# Patient Record
Sex: FEMALE | Race: WHITE | Employment: FULL TIME | URBAN - METROPOLITAN AREA
[De-identification: names, ages, dates, MRNs, and addresses within clinical notes are randomized per-mention and may not be internally consistent; named-entity substitution may affect disease eponyms.]

---

## 2018-11-27 ENCOUNTER — HOSPITAL ENCOUNTER (EMERGENCY)
Age: 23
Discharge: HOME | End: 2018-11-27
Attending: STUDENT IN AN ORGANIZED HEALTH CARE EDUCATION/TRAINING PROGRAM

## 2018-11-27 ENCOUNTER — APPOINTMENT (OUTPATIENT)
Dept: GENERAL RADIOLOGY | Age: 23
End: 2018-11-27

## 2018-11-27 VITALS
WEIGHT: 142 LBS | RESPIRATION RATE: 20 BRPM | OXYGEN SATURATION: 96 % | TEMPERATURE: 98.5 F | BODY MASS INDEX: 26.81 KG/M2 | SYSTOLIC BLOOD PRESSURE: 123 MMHG | DIASTOLIC BLOOD PRESSURE: 82 MMHG | HEART RATE: 94 BPM | HEIGHT: 61 IN

## 2018-11-27 DIAGNOSIS — N91.2 AMENORRHEA: ICD-10-CM

## 2018-11-27 DIAGNOSIS — J20.9 ACUTE BRONCHITIS, UNSPECIFIED ORGANISM: Primary | ICD-10-CM

## 2018-11-27 LAB
BACTERIA: ABNORMAL /HPF
BILIRUBIN URINE: NEGATIVE
BLOOD, URINE: NEGATIVE
CHP ED QC CHECK: NORMAL
CLARITY: CLEAR
COLOR: YELLOW
EKG ATRIAL RATE: 80 BPM
EKG P AXIS: 43 DEGREES
EKG P-R INTERVAL: 160 MS
EKG Q-T INTERVAL: 402 MS
EKG QRS DURATION: 84 MS
EKG QTC CALCULATION (BAZETT): 463 MS
EKG R AXIS: 76 DEGREES
EKG T AXIS: 21 DEGREES
EKG VENTRICULAR RATE: 80 BPM
EPITHELIAL CELLS, UA: ABNORMAL /HPF (ref 0–5)
GLUCOSE URINE: NEGATIVE MG/DL
HYALINE CASTS: ABNORMAL /HPF (ref 0–5)
KETONES, URINE: NEGATIVE MG/DL
LEUKOCYTE ESTERASE, URINE: ABNORMAL
NITRITE, URINE: NEGATIVE
PH UA: 6 (ref 5–9)
PREGNANCY TEST URINE, POC: NEGATIVE
PROTEIN UA: NEGATIVE MG/DL
RBC UA: ABNORMAL /HPF (ref 0–2)
S PYO AG THROAT QL: NEGATIVE
SPECIFIC GRAVITY UA: 1.02 (ref 1–1.03)
URINE REFLEX TO CULTURE: YES
UROBILINOGEN, URINE: 0.2 E.U./DL
WBC UA: ABNORMAL /HPF (ref 0–5)

## 2018-11-27 PROCEDURE — 71046 X-RAY EXAM CHEST 2 VIEWS: CPT

## 2018-11-27 PROCEDURE — 6370000000 HC RX 637 (ALT 250 FOR IP): Performed by: STUDENT IN AN ORGANIZED HEALTH CARE EDUCATION/TRAINING PROGRAM

## 2018-11-27 PROCEDURE — 94640 AIRWAY INHALATION TREATMENT: CPT

## 2018-11-27 PROCEDURE — 87081 CULTURE SCREEN ONLY: CPT

## 2018-11-27 PROCEDURE — 99285 EMERGENCY DEPT VISIT HI MDM: CPT

## 2018-11-27 PROCEDURE — 87880 STREP A ASSAY W/OPTIC: CPT

## 2018-11-27 PROCEDURE — 87086 URINE CULTURE/COLONY COUNT: CPT

## 2018-11-27 PROCEDURE — 93005 ELECTROCARDIOGRAM TRACING: CPT

## 2018-11-27 PROCEDURE — 81001 URINALYSIS AUTO W/SCOPE: CPT

## 2018-11-27 RX ORDER — AZITHROMYCIN 500 MG/1
500 TABLET, FILM COATED ORAL ONCE
Status: COMPLETED | OUTPATIENT
Start: 2018-11-27 | End: 2018-11-27

## 2018-11-27 RX ORDER — IPRATROPIUM BROMIDE AND ALBUTEROL SULFATE 2.5; .5 MG/3ML; MG/3ML
1 SOLUTION RESPIRATORY (INHALATION) ONCE
Status: COMPLETED | OUTPATIENT
Start: 2018-11-27 | End: 2018-11-27

## 2018-11-27 RX ORDER — BENZONATATE 200 MG/1
200 CAPSULE ORAL 3 TIMES DAILY PRN
Qty: 21 CAPSULE | Refills: 0 | Status: SHIPPED | OUTPATIENT
Start: 2018-11-27 | End: 2021-12-26

## 2018-11-27 RX ORDER — ALBUTEROL SULFATE 90 UG/1
AEROSOL, METERED RESPIRATORY (INHALATION)
Qty: 1 INHALER | Refills: 1 | Status: SHIPPED | OUTPATIENT
Start: 2018-11-27

## 2018-11-27 RX ORDER — AZITHROMYCIN 250 MG/1
TABLET, FILM COATED ORAL
Qty: 1 PACKET | Refills: 0 | Status: SHIPPED | OUTPATIENT
Start: 2018-11-27 | End: 2018-12-01

## 2018-11-27 RX ADMIN — IPRATROPIUM BROMIDE AND ALBUTEROL SULFATE 1 AMPULE: .5; 3 SOLUTION RESPIRATORY (INHALATION) at 14:13

## 2018-11-27 RX ADMIN — AZITHROMYCIN MONOHYDRATE 500 MG: 500 TABLET ORAL at 15:37

## 2018-11-27 ASSESSMENT — PULMONARY FUNCTION TESTS
PEFR_L/MIN: 200
PEFR_L/MIN: 150

## 2018-11-27 ASSESSMENT — ENCOUNTER SYMPTOMS
COUGH: 1
SHORTNESS OF BREATH: 0
ABDOMINAL PAIN: 0
DIARRHEA: 0
SORE THROAT: 1
VOMITING: 1
CHEST TIGHTNESS: 0
BACK PAIN: 0
SINUS PRESSURE: 0
TROUBLE SWALLOWING: 0

## 2018-11-27 ASSESSMENT — PAIN SCALES - GENERAL: PAINLEVEL_OUTOF10: 4

## 2018-11-27 ASSESSMENT — PAIN DESCRIPTION - PAIN TYPE: TYPE: ACUTE PAIN

## 2018-11-27 ASSESSMENT — PAIN DESCRIPTION - LOCATION: LOCATION: CHEST

## 2018-11-27 NOTE — ED TRIAGE NOTES
Pt arrived to ER with c/o cough and congestion x4 days. Pt states today she began having dizziness and chest hurts to deep breathe. Pt is A&Ox4, skin p/w/d. resp even and unlabored.

## 2018-11-27 NOTE — ED PROVIDER NOTES
findings:        Interpretation per the Radiologist below, if available at the time ofthis note:    XR CHEST STANDARD (2 VW)   Final Result   NO ACUTE ACTIVE CARDIOPULMONARY PROCESS            ED BEDSIDE ULTRASOUND:   Performed by ED Physician - none    LABS:  Labs Reviewed   URINE RT REFLEX TO CULTURE - Abnormal; Notable for the following:        Result Value    Leukocyte Esterase, Urine TRACE (*)     All other components within normal limits   MICROSCOPIC URINALYSIS - Abnormal; Notable for the following:     WBC, UA 6-10 (*)     All other components within normal limits   POC PREGNANCY UR-QUAL - Normal   RAPID STREP SCREEN   URINE CULTURE   CULTURE BETA STREP CONFIRM PLATE       All other labs were within normal range or not returned as of this dictation. EMERGENCY DEPARTMENT COURSE and DIFFERENTIAL DIAGNOSIS/MDM:   Vitals:    Vitals:    11/27/18 1333 11/27/18 1406 11/27/18 1414   BP: 123/82     Pulse: 94     Resp:  16 20   Temp: 98.5 °F (36.9 °C)     SpO2: 96%     Weight: 142 lb (64.4 kg)     Height: 5' 1\" (1.549 m)             MDM  She states that she is frequently coughing and sometimes coughs so hard that she vomits. Regular cover the patient with macrolides to cover atypical infections. I discussed the findings with the patient she verbalized understanding as no further questions. Chest x-ray is negative. CONSULTS:  None    PROCEDURES:  Unless otherwise noted below, none     Procedures    FINAL IMPRESSION      1. Acute bronchitis, unspecified organism    2.  Amenorrhea          DISPOSITION/PLAN   DISPOSITION Discharge - Pending Orders Complete 11/27/2018 03:29:49 PM      PATIENT REFERRED TO:  [facility]  [address] [place]  [phone]  Call in 1 day        DISCHARGE MEDICATIONS:  New Prescriptions    ALBUTEROL SULFATE HFA (PROAIR HFA) 108 (90 BASE) MCG/ACT INHALER    Use every 4 hours while awake for 7-10 days then PRN wheezing  Dispense with SPACER and Instruct

## 2018-11-28 LAB — URINE CULTURE, ROUTINE: NORMAL

## 2018-11-29 LAB — S PYO THROAT QL CULT: NORMAL

## 2018-11-29 PROCEDURE — 93010 ELECTROCARDIOGRAM REPORT: CPT | Performed by: INTERNAL MEDICINE

## 2019-01-18 ENCOUNTER — APPOINTMENT (OUTPATIENT)
Dept: GENERAL RADIOLOGY | Age: 24
End: 2019-01-18
Payer: COMMERCIAL

## 2019-01-18 ENCOUNTER — HOSPITAL ENCOUNTER (EMERGENCY)
Age: 24
Discharge: HOME | End: 2019-01-18
Payer: COMMERCIAL

## 2019-01-18 VITALS
OXYGEN SATURATION: 100 % | WEIGHT: 142 LBS | TEMPERATURE: 98 F | BODY MASS INDEX: 26.81 KG/M2 | HEART RATE: 65 BPM | HEIGHT: 61 IN | DIASTOLIC BLOOD PRESSURE: 78 MMHG | SYSTOLIC BLOOD PRESSURE: 137 MMHG | RESPIRATION RATE: 16 BRPM

## 2019-01-18 DIAGNOSIS — M79.672 LEFT FOOT PAIN: Primary | ICD-10-CM

## 2019-01-18 PROCEDURE — 73630 X-RAY EXAM OF FOOT: CPT

## 2019-01-18 PROCEDURE — 99283 EMERGENCY DEPT VISIT LOW MDM: CPT

## 2019-01-18 RX ORDER — IBUPROFEN 800 MG/1
800 TABLET ORAL EVERY 8 HOURS PRN
Qty: 20 TABLET | Refills: 0 | Status: SHIPPED | OUTPATIENT
Start: 2019-01-18

## 2019-01-18 ASSESSMENT — PAIN DESCRIPTION - ORIENTATION: ORIENTATION: LEFT

## 2019-01-18 ASSESSMENT — ENCOUNTER SYMPTOMS
SORE THROAT: 0
RHINORRHEA: 0
EYE PAIN: 0
NAUSEA: 0
SHORTNESS OF BREATH: 0
PHOTOPHOBIA: 0
BACK PAIN: 0
ABDOMINAL PAIN: 0
VOMITING: 0
DIARRHEA: 0
COUGH: 0

## 2019-01-18 ASSESSMENT — PAIN DESCRIPTION - LOCATION: LOCATION: FOOT

## 2019-01-18 ASSESSMENT — PAIN DESCRIPTION - PAIN TYPE: TYPE: ACUTE PAIN

## 2019-01-18 ASSESSMENT — PAIN SCALES - GENERAL: PAINLEVEL_OUTOF10: 7

## 2021-12-26 ENCOUNTER — HOSPITAL ENCOUNTER (EMERGENCY)
Age: 26
Discharge: HOME | End: 2021-12-26
Attending: EMERGENCY MEDICINE

## 2021-12-26 VITALS
RESPIRATION RATE: 18 BRPM | HEIGHT: 60 IN | WEIGHT: 172 LBS | TEMPERATURE: 99 F | SYSTOLIC BLOOD PRESSURE: 120 MMHG | DIASTOLIC BLOOD PRESSURE: 78 MMHG | BODY MASS INDEX: 33.77 KG/M2 | HEART RATE: 63 BPM | OXYGEN SATURATION: 98 %

## 2021-12-26 DIAGNOSIS — O20.0 THREATENED ABORTION, ANTEPARTUM: ICD-10-CM

## 2021-12-26 DIAGNOSIS — R07.89 ATYPICAL CHEST PAIN: Primary | ICD-10-CM

## 2021-12-26 LAB
ALBUMIN SERPL-MCNC: 4 G/DL (ref 3.5–4.6)
ALP BLD-CCNC: 63 U/L (ref 40–130)
ALT SERPL-CCNC: 42 U/L (ref 0–33)
ANION GAP SERPL CALCULATED.3IONS-SCNC: 14 MEQ/L (ref 9–15)
AST SERPL-CCNC: 32 U/L (ref 0–35)
BACTERIA: ABNORMAL /HPF
BASOPHILS ABSOLUTE: 0.1 K/UL (ref 0–0.1)
BASOPHILS RELATIVE PERCENT: 0.4 % (ref 0.1–1.2)
BILIRUB SERPL-MCNC: 0.3 MG/DL (ref 0.2–0.7)
BILIRUBIN URINE: NEGATIVE
BLOOD, URINE: NORMAL
BUN BLDV-MCNC: 10 MG/DL (ref 6–20)
CALCIUM SERPL-MCNC: 9.3 MG/DL (ref 8.5–9.9)
CHLORIDE BLD-SCNC: 103 MEQ/L (ref 95–107)
CLARITY: CLEAR
CO2: 20 MEQ/L (ref 20–31)
COLOR: YELLOW
CREAT SERPL-MCNC: 0.61 MG/DL (ref 0.5–0.9)
EKG ATRIAL RATE: 75 BPM
EKG P AXIS: 29 DEGREES
EKG P-R INTERVAL: 164 MS
EKG Q-T INTERVAL: 432 MS
EKG QRS DURATION: 76 MS
EKG QTC CALCULATION (BAZETT): 482 MS
EKG R AXIS: 88 DEGREES
EKG T AXIS: 30 DEGREES
EKG VENTRICULAR RATE: 75 BPM
EOSINOPHILS ABSOLUTE: 0.2 K/UL (ref 0–0.4)
EOSINOPHILS RELATIVE PERCENT: 1.4 % (ref 0.7–5.8)
EPITHELIAL CELLS, UA: ABNORMAL /HPF
GFR AFRICAN AMERICAN: >60
GFR NON-AFRICAN AMERICAN: >60
GLOBULIN: 3.1 G/DL (ref 2.3–3.5)
GLUCOSE BLD-MCNC: 115 MG/DL (ref 70–99)
GLUCOSE URINE: NEGATIVE MG/DL
GONADOTROPIN, CHORIONIC (HCG) QUANT: 161.2 MIU/ML
HCG(URINE) PREGNANCY TEST: POSITIVE
HCT VFR BLD CALC: 41.8 % (ref 37–47)
HEMOGLOBIN: 14.6 G/DL (ref 11.2–15.7)
IMMATURE GRANULOCYTES #: 0.1 K/UL
IMMATURE GRANULOCYTES %: 0.4 %
KETONES, URINE: NEGATIVE MG/DL
LEUKOCYTE ESTERASE, URINE: NEGATIVE
LYMPHOCYTES ABSOLUTE: 3.8 K/UL (ref 1.2–3.7)
LYMPHOCYTES RELATIVE PERCENT: 33.5 %
MAGNESIUM: 2 MG/DL (ref 1.7–2.4)
MCH RBC QN AUTO: 33 PG (ref 25.6–32.2)
MCHC RBC AUTO-ENTMCNC: 34.9 % (ref 32.2–35.5)
MCV RBC AUTO: 94.6 FL (ref 79.4–94.8)
MONOCYTES ABSOLUTE: 0.7 K/UL (ref 0.2–0.9)
MONOCYTES RELATIVE PERCENT: 6.3 % (ref 4.7–12.5)
NEUTROPHILS ABSOLUTE: 6.6 K/UL (ref 1.6–6.1)
NEUTROPHILS RELATIVE PERCENT: 58 % (ref 34–71.1)
NITRITE, URINE: NEGATIVE
PDW BLD-RTO: 11.7 % (ref 11.7–14.4)
PH UA: 7 (ref 5–9)
PLATELET # BLD: 220 K/UL (ref 182–369)
POTASSIUM SERPL-SCNC: 4.1 MEQ/L (ref 3.4–4.9)
PROTEIN UA: NEGATIVE MG/DL
RBC # BLD: 4.42 M/UL (ref 3.93–5.22)
RBC UA: ABNORMAL /HPF (ref 0–2)
SODIUM BLD-SCNC: 137 MEQ/L (ref 135–144)
SPECIFIC GRAVITY UA: 1.01 (ref 1–1.03)
TOTAL PROTEIN: 7.1 G/DL (ref 6.3–8)
TROPONIN: <0.01 NG/ML (ref 0–0.01)
URINE REFLEX TO CULTURE: NORMAL
UROBILINOGEN, URINE: 0.2 E.U./DL
WBC # BLD: 11.4 K/UL (ref 4–10)
WBC UA: ABNORMAL /HPF (ref 0–5)

## 2021-12-26 PROCEDURE — 85025 COMPLETE CBC W/AUTO DIFF WBC: CPT

## 2021-12-26 PROCEDURE — 84703 CHORIONIC GONADOTROPIN ASSAY: CPT

## 2021-12-26 PROCEDURE — 80053 COMPREHEN METABOLIC PANEL: CPT

## 2021-12-26 PROCEDURE — 93005 ELECTROCARDIOGRAM TRACING: CPT

## 2021-12-26 PROCEDURE — 93010 ELECTROCARDIOGRAM REPORT: CPT | Performed by: INTERNAL MEDICINE

## 2021-12-26 PROCEDURE — 81001 URINALYSIS AUTO W/SCOPE: CPT

## 2021-12-26 PROCEDURE — 83735 ASSAY OF MAGNESIUM: CPT

## 2021-12-26 PROCEDURE — 84484 ASSAY OF TROPONIN QUANT: CPT

## 2021-12-26 PROCEDURE — 84702 CHORIONIC GONADOTROPIN TEST: CPT

## 2021-12-26 PROCEDURE — 36415 COLL VENOUS BLD VENIPUNCTURE: CPT

## 2021-12-26 PROCEDURE — 6370000000 HC RX 637 (ALT 250 FOR IP): Performed by: EMERGENCY MEDICINE

## 2021-12-26 PROCEDURE — 99284 EMERGENCY DEPT VISIT MOD MDM: CPT

## 2021-12-26 RX ORDER — ACETAMINOPHEN 500 MG
1000 TABLET ORAL ONCE
Status: COMPLETED | OUTPATIENT
Start: 2021-12-26 | End: 2021-12-26

## 2021-12-26 RX ORDER — ACETAMINOPHEN 500 MG
500 TABLET ORAL 4 TIMES DAILY PRN
Qty: 60 TABLET | Refills: 0 | Status: SHIPPED | OUTPATIENT
Start: 2021-12-26

## 2021-12-26 RX ADMIN — ACETAMINOPHEN 1000 MG: 500 TABLET ORAL at 01:55

## 2021-12-26 ASSESSMENT — ENCOUNTER SYMPTOMS
SHORTNESS OF BREATH: 0
SINUS PRESSURE: 0
APNEA: 0
PHOTOPHOBIA: 0
NAUSEA: 1
CONSTIPATION: 0
ABDOMINAL PAIN: 0
EYE PAIN: 0
ABDOMINAL DISTENTION: 0
SORE THROAT: 0
WHEEZING: 0
COUGH: 0
COLOR CHANGE: 0
VOMITING: 1
RHINORRHEA: 0
BACK PAIN: 0
DIARRHEA: 0

## 2021-12-26 ASSESSMENT — PAIN SCALES - GENERAL
PAINLEVEL_OUTOF10: 4
PAINLEVEL_OUTOF10: 10
PAINLEVEL_OUTOF10: 10

## 2021-12-26 ASSESSMENT — PAIN DESCRIPTION - PAIN TYPE: TYPE: ACUTE PAIN

## 2021-12-26 ASSESSMENT — PAIN DESCRIPTION - LOCATION: LOCATION: CHEST

## 2021-12-26 ASSESSMENT — PAIN SCALES - WONG BAKER: WONGBAKER_NUMERICALRESPONSE: 0

## 2021-12-26 NOTE — ED TRIAGE NOTES
Pt. Reports having chest pain for the last 2 weeks, she rates it 11/10, sharp/stabbing under left breast.  She is calm, relaxed during triage, VS normal.  She is 5 weeks pregnant, she has had one miscarriage. She reports N/V related with pregnancy. Pt. Denies fevers, chills, SOB.

## 2021-12-26 NOTE — ED PROVIDER NOTES
[facility] ED  eMERGENCY dEPARTMENT eNCOUnter      Pt Name: Satish Hilario  MRN: [id number]  [place] 1995  Date of evaluation: 2021  Provider: Sundar Del Cid MD    CHIEF COMPLAINT       Chief Complaint   Patient presents with    Chest Pain         HISTORY OF PRESENT ILLNESS   (Location/Symptom, Timing/Onset,Context/Setting, Quality, Duration, Modifying Factors, Severity)  Note limiting factors. Satish Hilario is a 27 y.o. female who presents to the emergency department with complaint of chest pains of 2 weeks duration. Patient is 5 weeks pregnant. Follows with Dr. [facility]. Denies nausea or vomiting. Patient is spotting. She is  2 para 0  1  Had Covid back in September. Has gotten her first shot of Covid vaccine. HPI    Nursing Notes were reviewed. REVIEW OF SYSTEMS    (2-9 systems for level 4, 10 or more for level 5)     Review of Systems   Constitutional: Negative. Negative for activity change, appetite change, chills, fatigue and fever. HENT: Negative for congestion, ear discharge, ear pain, hearing loss, rhinorrhea, sinus pressure and sore throat. Eyes: Negative for photophobia, pain and visual disturbance. Respiratory: Negative for apnea, cough, shortness of breath and wheezing. Cardiovascular: Positive for chest pain. Negative for palpitations and leg swelling. Gastrointestinal: Positive for nausea and vomiting. Negative for abdominal distention, abdominal pain, constipation and diarrhea. Endocrine: Negative for cold intolerance, heat intolerance and polyuria. Genitourinary: Positive for vaginal bleeding. Negative for dysuria, flank pain, frequency and urgency. Musculoskeletal: Negative for arthralgias, back pain, gait problem, myalgias and neck stiffness. Skin: Negative for color change, pallor and rash. Allergic/Immunologic: Negative for food allergies and immunocompromised state.    Neurological: Negative for dizziness, tremors, syncope, weakness, light-headedness and headaches. Psychiatric/Behavioral: Negative for agitation, confusion and hallucinations. All other systems reviewed and are negative. Except as noted above the remainder of the review of systems was reviewed and negative. PAST MEDICAL HISTORY     Past Medical History:   Diagnosis Date    Heart murmur          SURGICAL HISTORY     History reviewed. No pertinent surgical history. CURRENT MEDICATIONS       Previous Medications    ALBUTEROL SULFATE HFA (PROAIR HFA) 108 (90 BASE) MCG/ACT INHALER    Use every 4 hours while awake for 7-10 days then PRN wheezing  Dispense with SPACER and Instruct on use. May sub Ventolin or Proventil as needed per [employer]. IBUPROFEN (ADVIL;MOTRIN) 800 MG TABLET    Take 1 tablet by mouth every 8 hours as needed for Pain       ALLERGIES     Patient has no known allergies. FAMILY HISTORY     History reviewed. No pertinent family history. SOCIAL HISTORY       Social History     Socioeconomic History    Marital status: Single     Spouse name: None    Number of children: None    Years of education: None    Highest education level: None   Occupational History    None   Tobacco Use    Smoking status: Never Smoker    Smokeless tobacco: Never Used   Substance and Sexual Activity    Alcohol use: Yes    Drug use: No    Sexual activity: None   Other Topics Concern    None   Social History Narrative    None     Social Determinants of Health     Financial Resource Strain:     Difficulty of Paying Living Expenses: Not on file   Food Insecurity:     Worried About Running Out of Food in the Last Year: Not on file    Shannon of Food in the Last Year: Not on file   Transportation Needs:     Lack of Transportation (Medical): Not on file    Lack of Transportation (Non-Medical):  Not on file   Physical Activity:     Days of Exercise per Week: Not on file    Minutes of Exercise per Session: Not on file Stress:     Feeling of Stress : Not on file   Social Connections:     Frequency of Communication with Friends and Family: Not on file    Frequency of Social Gatherings with Friends and Family: Not on file    Attends Roman Catholic Services: Not on file    Active Member of [address] [place] [address] [employer] or Organizations: Not on file    Attends Club or Organization Meetings: Not on file    Marital Status: Not on file   Intimate Partner Violence:     Fear of Current or Ex-Partner: Not on file    Emotionally Abused: Not on file    Physically Abused: Not on file    Sexually Abused: Not on file   Housing Stability:     Unable to Pay for Housing in the Last Year: Not on file    Number of [place] in the Last Year: Not on file    Unstable Housing in the Last Year: Not on file       SCREENINGS             PHYSICAL EXAM    (up to 7 for level 4, 8 or more for level 5)     ED Triage Vitals [12/26/21 0033]   BP Temp Temp Source Pulse Resp SpO2 Height Weight   120/78 99 °F (37.2 °C) Oral 63 18 98 % 5' (1.524 m) 172 lb (78 kg)       Physical Exam  Vitals and nursing note reviewed. Constitutional:       General: She is not in acute distress. Appearance: Normal appearance. She is well-developed. She is obese. She is not ill-appearing, toxic-appearing or diaphoretic. HENT:      Head: Normocephalic and atraumatic. Nose: Nose normal. No congestion or rhinorrhea. Mouth/Throat:      Mouth: Mucous membranes are moist.      Pharynx: Oropharynx is clear. No oropharyngeal exudate or posterior oropharyngeal erythema. Eyes:      General: No scleral icterus. Right eye: No discharge. Left eye: No discharge. Extraocular Movements: Extraocular movements intact. Conjunctiva/sclera: Conjunctivae normal.      Pupils: Pupils are equal, round, and reactive to light. Neck:      Thyroid: No thyromegaly. Vascular: No carotid bruit or JVD. Trachea: No tracheal deviation.    Cardiovascular:      Rate and Rhythm: Normal rate and regular rhythm. Heart sounds: Normal heart sounds. No murmur heard. No friction rub. No gallop. Pulmonary:      Effort: Pulmonary effort is normal. No respiratory distress. Breath sounds: Normal breath sounds. No stridor. No wheezing, rhonchi or rales. Chest:      Chest wall: No tenderness. Abdominal:      General: Abdomen is flat. Bowel sounds are normal. There is no distension. Palpations: Abdomen is soft. There is no mass. Tenderness: There is no abdominal tenderness. There is no right CVA tenderness, left CVA tenderness, guarding or rebound. Hernia: No hernia is present. Musculoskeletal:         General: No swelling, tenderness, deformity or signs of injury. Normal range of motion. Cervical back: Normal range of motion and neck supple. No rigidity or tenderness. Right lower leg: No edema. Left lower leg: No edema. Lymphadenopathy:      Cervical: No cervical adenopathy. Skin:     General: Skin is warm and dry. Capillary Refill: Capillary refill takes less than 2 seconds. Coloration: Skin is not jaundiced or pale. Findings: No bruising, erythema, lesion or rash. Neurological:      General: No focal deficit present. Mental Status: She is alert and oriented to person, place, and time. Mental status is at baseline. Cranial Nerves: No cranial nerve deficit. Sensory: No sensory deficit. Motor: No weakness or abnormal muscle tone. Coordination: Coordination normal.      Gait: Gait normal.      Deep Tendon Reflexes: Reflexes are normal and symmetric. Reflexes normal.   Psychiatric:         Behavior: Behavior normal.         Thought Content:  Thought content normal.         Judgment: Judgment normal.         DIAGNOSTIC RESULTS     EKG: All EKG's are interpreted by the Emergency Department Physician who either signs or Co-signs this chart in the absence of a cardiologist.    Twelve-lead EKG shows normal sinus rhythm, rate 75 bpm, normal electrical axis, prolonged QT interval no acute ST-T wave changes. RADIOLOGY:   Non-plain film images such as CT, Ultrasound and MRI are read by the radiologist. Raudel Shoemaker radiographicimages are visualized and preliminarily interpreted by the emergency physician with the below findings:        Interpretation per the Radiologist below, if available at the time of this note:    No orders to display         ED BEDSIDE ULTRASOUND:   Performed by ED Physician - none    LABS:  Labs Reviewed   CBC WITH AUTO DIFFERENTIAL - Abnormal; Notable for the following components:       Result Value    WBC 11.4 (*)     MCH 33.0 (*)     Neutrophils Absolute 6.6 (*)     Lymphocytes Absolute 3.8 (*)     All other components within normal limits   MICROSCOPIC URINALYSIS - Abnormal; Notable for the following components:    Bacteria, UA RARE (*)     All other components within normal limits   PREGNANCY, URINE   URINE RT REFLEX TO CULTURE   MAGNESIUM   COMPREHENSIVE METABOLIC PANEL   TROPONIN   HCG, QUANTITATIVE, PREGNANCY       All other labs were within normal range or not returned as of this dictation. EMERGENCY DEPARTMENT COURSE and DIFFERENTIALDIAGNOSIS/MDM:   Vitals:    Vitals:    12/26/21 0033   BP: 120/78   Pulse: 63   Resp: 18   Temp: 99 °F (37.2 °C)   TempSrc: Oral   SpO2: 98%   Weight: 172 lb (78 kg)   Height: 5' (1.524 m)           MDM  Number of Diagnoses or Management Options     Amount and/or Complexity of Data Reviewed  Clinical lab tests: reviewed and ordered  Tests in the medicine section of CPT®: reviewed and ordered    Risk of Complications, Morbidity, and/or Mortality  Presenting problems: moderate  Diagnostic procedures: moderate  Management options: moderate    Patient Progress  Patient progress: improved      CRITICAL CARE TIME   Total Critical Care time was  minutes, excluding separately reportable procedures.   There was a high probability of clinically significant/life threatening deterioration in the patient's condition which required my urgentintervention. CONSULTS:  None    PROCEDURES:  Unless otherwise noted below, none     Procedures    FINAL IMPRESSION      1. Atypical chest pain    2. Threatened , antepartum          DISPOSITION/PLAN   DISPOSITION        PATIENT REFERRED TO:  Krysta Penn [address]   [facility] [address]  [phone]    In 3 days      [address] [phone]    In 1 day  Absolute bedrest until seen by your OB GYN Dr. Lopez Guzman within 3 days      DISCHARGE MEDICATIONS:  New Prescriptions    ACETAMINOPHEN (TYLENOL) 500 MG TABLET    Take 1 tablet by mouth 4 times daily as needed for Pain          (Please note that portions of this note were completed with a voice recognitionprogram.  Efforts were made to edit the dictations but occasionally words are mis-transcribed.)    Bruno Pérez MD (electronically signed)  Attending Emergency Physician          Bruno Pérez MD  21 [phone]       Bruno Pérez MD  21 [phone]

## 2021-12-27 ENCOUNTER — HOSPITAL ENCOUNTER (EMERGENCY)
Age: 26
Discharge: HOME | End: 2021-12-28
Attending: EMERGENCY MEDICINE

## 2021-12-27 VITALS
RESPIRATION RATE: 20 BRPM | HEART RATE: 96 BPM | DIASTOLIC BLOOD PRESSURE: 81 MMHG | SYSTOLIC BLOOD PRESSURE: 127 MMHG | OXYGEN SATURATION: 99 %

## 2021-12-27 DIAGNOSIS — R07.81 RIB PAIN ON LEFT SIDE: Primary | ICD-10-CM

## 2021-12-27 PROCEDURE — 99282 EMERGENCY DEPT VISIT SF MDM: CPT

## 2021-12-27 ASSESSMENT — PAIN DESCRIPTION - DESCRIPTORS: DESCRIPTORS: SHARP

## 2021-12-27 ASSESSMENT — PAIN DESCRIPTION - ORIENTATION: ORIENTATION: LEFT

## 2021-12-27 ASSESSMENT — PAIN DESCRIPTION - LOCATION: LOCATION: RIB CAGE

## 2021-12-27 ASSESSMENT — PAIN SCALES - GENERAL: PAINLEVEL_OUTOF10: 10

## 2021-12-28 LAB
BACTERIA: ABNORMAL /HPF
BILIRUBIN URINE: NEGATIVE
BLOOD, URINE: NORMAL
CLARITY: CLEAR
COLOR: YELLOW
EKG ATRIAL RATE: 65 BPM
EKG P AXIS: 29 DEGREES
EKG P-R INTERVAL: 182 MS
EKG Q-T INTERVAL: 468 MS
EKG QRS DURATION: 80 MS
EKG QTC CALCULATION (BAZETT): 486 MS
EKG R AXIS: 72 DEGREES
EKG T AXIS: 9 DEGREES
EKG VENTRICULAR RATE: 65 BPM
EPITHELIAL CELLS, UA: ABNORMAL /HPF
GLUCOSE URINE: NEGATIVE MG/DL
HCG(URINE) PREGNANCY TEST: POSITIVE
KETONES, URINE: NEGATIVE MG/DL
LEUKOCYTE ESTERASE, URINE: NEGATIVE
NITRITE, URINE: NEGATIVE
PH UA: 5.5 (ref 5–9)
PROTEIN UA: NEGATIVE MG/DL
RBC UA: ABNORMAL /HPF (ref 0–2)
SPECIFIC GRAVITY UA: >=1.03 (ref 1–1.03)
UROBILINOGEN, URINE: 0.2 E.U./DL
WBC UA: ABNORMAL /HPF (ref 0–5)

## 2021-12-28 PROCEDURE — 93010 ELECTROCARDIOGRAM REPORT: CPT | Performed by: INTERNAL MEDICINE

## 2021-12-28 PROCEDURE — 81001 URINALYSIS AUTO W/SCOPE: CPT

## 2021-12-28 PROCEDURE — 6370000000 HC RX 637 (ALT 250 FOR IP): Performed by: EMERGENCY MEDICINE

## 2021-12-28 PROCEDURE — 93005 ELECTROCARDIOGRAM TRACING: CPT

## 2021-12-28 PROCEDURE — 84703 CHORIONIC GONADOTROPIN ASSAY: CPT

## 2021-12-28 RX ORDER — ACETAMINOPHEN 500 MG
1000 TABLET ORAL ONCE
Status: COMPLETED | OUTPATIENT
Start: 2021-12-28 | End: 2021-12-28

## 2021-12-28 RX ADMIN — ACETAMINOPHEN 1000 MG: 500 TABLET ORAL at 03:00

## 2021-12-28 ASSESSMENT — PAIN SCALES - GENERAL: PAINLEVEL_OUTOF10: 10

## 2021-12-28 NOTE — ED PROVIDER NOTES
eMERGENCY dEPARTMENT eNCOUnter      CHIEF COMPLAINT    Chief Complaint   Patient presents with    Miscarriage     pt states she had a quant done today that showed her numbers trending down, Pt states she cant get into her OB, pt states she is type A positive, pt states she is 5 weeks pregnant    Rib Pain     pt states for 2 weeks, left side       HPI    Jarrod Garcia is a 27 y.o. female with history of heart murmur who presentsto ED from home  By private car  With complaint of left-sided chest pain  Onset 2 weeks  Intensity of symptoms sharp with no radiation  Patient is  3 para 0 follows up with OB at [facility]. Her quant hCG  has been dropping and she is having vaginal bleeding. Patient denies any abdominal pain or pelvic pain. Patient blood type is A+. Patient denies any shortness of breath. Patient did not take any over-the-counter medication for pain control. Patient denies any cough, fever, chills, UTI symptoms. PAST MEDICAL HISTORY    Past Medical History:   Diagnosis Date    Heart murmur        SURGICAL HISTORY    History reviewed. No pertinent surgical history. CURRENT MEDICATIONS    Current Outpatient Rx   Medication Sig Dispense Refill    acetaminophen (TYLENOL) 500 MG tablet Take 1 tablet by mouth 4 times daily as needed for Pain 60 tablet 0    ibuprofen (ADVIL;MOTRIN) 800 MG tablet Take 1 tablet by mouth every 8 hours as needed for Pain 20 tablet 0    albuterol sulfate HFA (PROAIR HFA) 108 (90 Base) MCG/ACT inhaler Use every 4 hours while awake for 7-10 days then PRN wheezing  Dispense with SPACER and Instruct on use. May sub Ventolin or Proventil as needed per [employer]. 1 Inhaler 1       ALLERGIES    No Known Allergies    FAMILY HISTORY    History reviewed. No pertinent family history.     SOCIAL HISTORY    Social History     Socioeconomic History    Marital status: Single     Spouse name: None    Number of children: None    Years of education: None    Highest education level: None   Occupational History    None   Tobacco Use    Smoking status: Never Smoker    Smokeless tobacco: Never Used   Substance and Sexual Activity    Alcohol use: Not Currently    Drug use: No    Sexual activity: None   Other Topics Concern    None   Social History Narrative    None     Social Determinants of Health     Financial Resource Strain:     Difficulty of Paying Living Expenses: Not on file   Food Insecurity:     Worried About Running Out of Food in the Last Year: Not on file    Shannon of Food in the Last Year: Not on file   Transportation Needs:     Lack of Transportation (Medical): Not on file    Lack of Transportation (Non-Medical):  Not on file   Physical Activity:     Days of Exercise per Week: Not on file    Minutes of Exercise per Session: Not on file   Stress:     Feeling of Stress : Not on file   Social Connections:     Frequency of Communication with Friends and Family: Not on file    Frequency of Social Gatherings with Friends and Family: Not on file    Attends Alevism Services: Not on file    Active Member of [address] [place] [address] or Organizations: Not on file    Attends Club or Organization Meetings: Not on file    Marital Status: Not on file   Intimate Partner Violence:     Fear of Current or Ex-Partner: Not on file    Emotionally Abused: Not on file    Physically Abused: Not on file    Sexually Abused: Not on file   Housing Stability:     Unable to Pay for Housing in the Last Year: Not on file    Number of [place] in the Last Year: Not on file    Unstable Housing in the Last Year: Not on file       REVIEW OF SYSTEMS    Constitutional:  Denies fever, chills, weight loss or weakness   Eyes:  Denies photophobia or discharge   HENT:  Denies sore throat or ear pain   Respiratory:  Denies cough or shortness of breath   Cardiovascular: Complains of chest pain, but denies palpitations or swelling   GI:  Denies abdominal pain, nausea, vomiting, or diarrhea Musculoskeletal:  Denies back pain   Skin:  Denies rash   Neurologic:  Denies headache, focal weakness or sensory changes   Endocrine:  Denies polyuria or polydypsia   Lymphatic:  Denies swollen glands   Psychiatric:  Denies depression, suicidal ideation or homicidal ideation   All systems negative except as marked. PHYSICAL EXAM    VITAL SIGNS: /81   Pulse 96   Resp 20   LMP 11/21/2021 (Exact Date)   SpO2 99%    Constitutional:  Well developed, Well nourished, mild acute distress, Non-toxic appearance. HENT:  Normocephalic, Atraumatic, Bilateral external ears normal, Oropharynx moist, No oral exudates, Nose normal. Neck- Normal range of motion, No tenderness, Supple, No stridor. Eyes:  PERRL, EOMI, Conjunctiva normal, No discharge. Respiratory:  Normal breath sounds, No respiratory distress, No wheezing, left lower rib chest wall tenderness-reproduces patient symptoms  Cardiovascular:  Normal heart rate, Normal rhythm, No murmurs, No rubs, No gallops. GI:  Bowel sounds normal, Soft, No tenderness, No masses, No pulsatile masses. No right lower quadrant or left lower quadrant tenderness. No rebound or rigidity. : No CVA tenderness. Musculoskeletal:  Intact distal pulses, No edema, No tenderness, No cyanosis, No clubbing. Good range of motion in all major joints. No tenderness to palpation or major deformities noted. Back- No tenderness. Integument:  Warm, Dry, No erythema, No rash. Lymphatic:  No lymphadenopathy noted. Neurologic:  Alert & oriented x 3, Normal motor function, Normal sensory function, No focal deficits noted. Psychiatric:  Affect normal, Judgment normal, Mood normal.     NSR, HR 65 , Normal Axis, No ST- T wave changes, QTc 486    RADIOLOGY    No orders to display       REEVALUATION     Pain control adequate.     Labs  Labs Reviewed   MICROSCOPIC URINALYSIS - Abnormal; Notable for the following components:       Result Value    RBC, UA 5-10 (*)     Bacteria, UA RARE (*)     All other components within normal limits   PREGNANCY, URINE   URINALYSIS             Summation      Patient Course:     ED Medications administered this visit:    Medications   acetaminophen (TYLENOL) tablet 1,000 mg (has no administration in time range)       New Prescriptions from this visit:    New Prescriptions    No medications on file       Follow-up:  Alejandra Schmitt, [address]. [facility] [id number] [phone]    Call in 1 day      your ob    Call in 1 day          Final Impression:   1.  Rib pain on left side               (Please note that portions of this note were completed with a voice recognition program.  Efforts were made to edit the dictations but occasionally words are mis-transcribed.)          Anshul Bills MD  12/28/21 4847

## 2023-10-20 ENCOUNTER — OFFICE VISIT (OUTPATIENT)
Dept: ORTHOPEDIC SURGERY | Facility: CLINIC | Age: 28
End: 2023-10-20
Payer: COMMERCIAL

## 2023-10-20 ENCOUNTER — CLINICAL SUPPORT (OUTPATIENT)
Dept: ORTHOPEDIC SURGERY | Facility: CLINIC | Age: 28
End: 2023-10-20
Payer: COMMERCIAL

## 2023-10-20 VITALS — HEIGHT: 60 IN | WEIGHT: 180 LBS | BODY MASS INDEX: 35.34 KG/M2

## 2023-10-20 DIAGNOSIS — M79.641 HAND PAIN, RIGHT: Primary | ICD-10-CM

## 2023-10-20 PROBLEM — S66.127A: Status: ACTIVE | Noted: 2023-10-20

## 2023-10-20 PROCEDURE — 99214 OFFICE O/P EST MOD 30 MIN: CPT | Mod: 57 | Performed by: STUDENT IN AN ORGANIZED HEALTH CARE EDUCATION/TRAINING PROGRAM

## 2023-10-20 PROCEDURE — 99204 OFFICE O/P NEW MOD 45 MIN: CPT | Performed by: STUDENT IN AN ORGANIZED HEALTH CARE EDUCATION/TRAINING PROGRAM

## 2023-10-20 PROCEDURE — 1036F TOBACCO NON-USER: CPT | Performed by: STUDENT IN AN ORGANIZED HEALTH CARE EDUCATION/TRAINING PROGRAM

## 2023-10-20 RX ORDER — ACETAMINOPHEN 325 MG/1
650 TABLET ORAL
COMMUNITY
Start: 2023-09-23

## 2023-10-20 RX ORDER — SODIUM CHLORIDE, SODIUM LACTATE, POTASSIUM CHLORIDE, CALCIUM CHLORIDE 600; 310; 30; 20 MG/100ML; MG/100ML; MG/100ML; MG/100ML
100 INJECTION, SOLUTION INTRAVENOUS CONTINUOUS
Status: CANCELLED | OUTPATIENT
Start: 2023-10-20

## 2023-10-20 NOTE — Clinical Note
October 20, 2023     Patient: Dawn Frederick   YOB: 1995   Date of Visit: 10/20/2023       To Whom It May Concern:    It is my medical opinion that Dawn Frederick {Work release (duty restriction):69011}.    If you have any questions or concerns, please don't hesitate to call.         Sincerely,        Teresa Elena MD    CC: No Recipients

## 2023-10-20 NOTE — Clinical Note
October 20, 2023     Patient: Dawn Frederick   YOB: 1995   Date of Visit: 10/20/2023       To Whom It May Concern:    It is my medical opinion that Dawn Frederick {Work release (duty restriction):70127}.    If you have any questions or concerns, please don't hesitate to call.         Sincerely,        Teresa Elena MD    CC: No Recipients

## 2023-10-20 NOTE — PROGRESS NOTES
History of Present Illness:  Caroline is right-hand-dominant.  Works at an urgent care.  Presents with right small finger pain.  She fell up the stairs on 10/16/2023.  Had an immediate pain to the digit and was seen at urgent care with negative x-rays.     Review of Systems   GENERAL: Negative for malaise, significant weight loss, fever  MUSCULOSKELETAL: see HPI  NEURO:  Negative    The patient's past medical history, family history, social history, and review of systems were reviewed. History is otherwise negative except as stated in the HPI.    Physical Examination:  The patient appears to be their stated age, is in no apparent distress, and is oriented x3. The patients mood and affect are appropriate. The patients gait is normal. The examination of the limb in question was performed in comparison to the contralateral limb.    On musculoskeletal examination,   Right upper extremity evaluated.  Tenderness to palpation over the small finger DIP PIP and.  No tenderness palpation throughout the palm.  She is unable to flex at the DIP, PIP, or MCP.  Concern for FDP/FDS rupture.  She is able to fully flex the remainder of her fingers.  Sensation intact light touch the radial ulnar and median nerve distribution.  Hand is warm well perfused.    Imaging:  Radiographic notes: Imaging from urgent care reviewed and no evidence of fracture.    Assessment:  Patient with flexor tendon rupture to the small finger    Plan:  Recommend exploration of small finger and flexor tendon repair surgery.   The benefit of surgery would be to stop the progression of symptoms, prevent deformity, with the hope that the symptoms would also resolve. The risks of surgery include, but are not limited to, infection, bleeding/hematoma, nerve/vessel injury, wound dehisence, persistent symptoms, and anesthetic complications. The patient understood the risks/benefits and consented to surgery. I will schedule them in the near future. I have answered all  questions regarding the surgery itself and the post-operative course.      Follow up: 2 weeks after surgery.  She does need an OT appointment at this visit    Teresa Elena MD

## 2023-10-20 NOTE — LETTER
October 20, 2023     Patient: Dawn Frederick   YOB: 1995   Date of Visit: 10/20/2023       To Whom it May Concern:    Dawn Frederick was seen in my clinic on 10/20/2023. She  may return to work on 10/23/23 .    If you have any questions or concerns, please don't hesitate to call.         Sincerely,          Teresa Elena MD        CC: No Recipients  
abdomen

## 2023-10-24 ENCOUNTER — TRANSCRIBE ORDERS (OUTPATIENT)
Dept: PHYSICAL THERAPY | Facility: CLINIC | Age: 28
End: 2023-10-24

## 2023-10-24 DIAGNOSIS — S66.127A LACERATION OF FLEXOR MUSCLE, FASCIA AND TENDON OF LEFT LITTLE FINGER AT WRIST AND HAND LEVEL, INITIAL ENCOUNTER: Primary | ICD-10-CM

## 2023-10-24 RX ORDER — MECLIZINE HYDROCHLORIDE 25 MG/1
25 TABLET ORAL 3 TIMES DAILY PRN
COMMUNITY

## 2023-10-25 NOTE — DISCHARGE INSTRUCTIONS
RIGHT HAND SURGERY  Post Operative Instructions   Teresa Kate MD     Dressing  You have a bulky dressing on your hand.      x  Do NOT remove dressing until next appointment with Dr. Kate or Occupational Therapy (OT). Please call Dr. Kate's office if an appointment is not already arranged.  _______________________________________________________________________________    If you experience persistent numbness, tingling or burning sensation in your hand, it may be due to a cast or surgical dressing that is too tight. Keep your hand elevated, and if this does not resolve the problem, call your doctor immediately.    If your dressing, splint, or cast gets wet, contact your physician’s office.     Activity  If pain will allow, try to flex and extend the fingers on the operated hand.  The dressing and swelling may cause the fingers to be stiff and this is common.  If the fingers turn blue, purple, or remain numb after the block has worn off, call the office immediately.      Showering  You may shower as soon as you want after surgery. Please cover the dressing with a bag.    If you are allowed to remove your dressing, you may shower and get the wound wet after you have been told it is safe to remove your dressing.  You may allow the water to run over the incisions and pat the incision dry. DO NOT let the wound soak in a tub, pool, or dish water. If you are using a band aid to cover your incision after a shower, make sure the incision is completely dry.    Ice & Elevate  The use of ice on your arm/hand after surgery will help with both pain control and swelling.  Continue with icing your arm/hand for the first 48 hours after surgery.  It can take a while for the coolness of the ice to get through the splint/cast, but be patient, it will work. Thereafter, use it on an as needed basis.    Elevate your hand above your heart level as much as possible for the first 48 hours, even while walking.  This will keep the  swelling to a minimum and prevent throbbing and pain. Elevation reduces swelling and minimizes pain. Less swelling is associated with a lower infection rate, fewer wound complications, less post-operative stiffness, and more rapid recovery of function. To keep the swelling down, your hand must be kept above the level of your heart.     One common mistake people make is they will put pillows or blankets under their elbow while sitting or laying down. Please always keep the hand above the elbow and move your fingers as much as possible! Swelling tends to collect in the lowest part of the body so if your hand is below the rest of your arm, your fingers and hand will swell and become stiff.    Pain Medication  If you received a regional anesthetic block your arm and hand may be numb for several hours (6-24 hours).  You will be discharged from the surgery center to home with an oral pain medication (analgesic).  Rest and elevation is still one of the most important factors for pain control. Take your pain medication as directed even though the pain is minimal with the block; do not wait for the pain to become out of control.  The most severe pain occurs as the block wears off.  Even if you are not having pain but feel the arm and hand “waking up”, take your pain medication so that it will be working when needed.    DO NOT drink alcoholic beverages or smoke while taking your pain medications. DO NOT drive a car or other vehicle or operate any machinery while under the influence of your medication.    It is important NOT TO WAIT until your pain is severe before taking your medication since the medications often take an hour or longer before you will begin to feel some relief. Take the medication as soon as you experience even mild pain the first night after surgery. Usually by the second or third day after surgery your upper extremity will begin to feel better and you will require much less pain  medication.    **Prescription refills will only be addressed during normal business hours.  Narcotic refills will not be addressed after hours or on weekends as the physician on call does not have access to your medical records.**    Side Effects: All pain medications can cause nausea, vomiting, and/or constipation. It is best to take pain medication with food. If these problems become significant, please contact our office. Have a phone number for your pharmacy available.    Diet  Eat light the day of surgery and resume normal diet tomorrow. Increase your fluid intake due to pain medications    Driving  It is not advisable to drive a vehicle while you are on pain medication, due to the possible side effects.  However, once you are off pain medication and you feel that you are able to safely control the vehicle, you may drive.    Warning Signs  Fever: A low-grade fever (less than 101 degrees) following surgery and lasting for several days is quite common. You may even have some slight chills and sweating. If you have a low-grade fever you should make an effort to cough and breathe deeply to clear congestion from your lungs.     After hospital discharge, call your physician if you experience:  A temperature over 101 degrees (38.6 C)   Increasing pain (despite adequate elevation and maximal oral pain medication)   Increasing or foul smelling drainage (after the first 24 hours)     If any of the above occurs, please notify Dr. Kate's office.      Follow-up Appointments    We are interested in your prompt and complete recovery from surgery. If you have any problems or questions concerning your recovery, please call your doctor’s office.  Your doctor’s office can be called Monday --Friday, 8:00 am to 5:00 pm.     You should already have an appointment to see Dr. Kate within the next few weeks.  If you do not have this appointment, or need to change your appointment time, please contact our office.     Do not  hesitate to call with any questions or concerns.      Dr. Kate's office numbers are:    1) During normal business hours, 8AM to 5PM Monday through Friday, please call: [phone]  2) After hours questions can be directed to the physician on call at [phone]                                          Frequently Asked Questions    Patients often have similar questions after surgery. To help reduce unnecessary worry and stress after your surgery, we have answered some of your commonly asked questions.    What should I do if I see blood on my bandages?  Many patients bleed through their bandages after surgery. Do not let this alarm you. Please do not remove the initial bandage since this it is sterile and we want to maintain cleanliness around the wound until we change the dressing.    When can I shower or bathe?  Often we will keep a snug compression bandage on your arm for several weeks after surgery. You should not get this bandage wet. If the bandage should become wet, it will need to be changed. Keep your arm dry until the bandage is removed for good. Therefore, sponge baths are the recommended body cleansing method. Information regarding shower cast protectors is available at your surgeon’s office. Glad brand Press'n Seal is also a good, lower cost option for keeping your dressing dry while bathing.    What should I do if the bandages come off?  The bandages are placed in a very specific fashion. If the bandages are removed or come off, please place a sterile gauze wrap over the incisions. We should see you in the office the following day to replace your bandage.    When will the numbness that I feel in my hand wear off?  We usually numb your arm during surgery. Sometimes it takes up to 24 hours for the numbness to go away. You may continue to have some numbness in your fingers after this time because the nerves can be slightly bruised during surgery.    What do I do about swelling around my fingers and  behind my bandage?  All patients have a significant amount of swelling around their bandage and hand after surgery. This swelling will last for several months. After surgery you should elevate your hand higher than your heart to decrease swelling. This is particularly critical for several days after surgery while your incision is healing.  Once the wound is healed, the swelling will not harm your surgery. If you are concerned about a significant amount of swelling or redness, please call for an appointment. You should also try to move your free fingers (not those in the dressing) as much as possible to avoid stiffness and swelling.    How can I tell if an infection is developing in my upper extremity?  Many patients will have a slight drainage of yellowish fluid for 1 to 2 weeks after surgery. This does not mean that your hand/arm is infected. Severe wound infections usually occur from 5 to 10 days after surgery. If you notice extreme swelling, increased pain, or extreme redness, please contact us immediately. Usually if we treat an infection early with antibiotics, future surgery can be avoided.    What should I do if I experience nausea or vomiting due to the pain medications I am taking?  Many patients have nausea after surgery when taking pain medications. If the nausea and vomiting are severe, we will need to prescribe medication in an oral dissolving or suppository form.    When will the stitches be removed?  We usually remove the stitches at approximately 1 to 2 weeks after surgery. If you miss your appointment because of an emergency, do not be alarmed because the stitches can remain in place for many weeks without causing harm.    My arm itches under the cast/splint, is there anything I can do?  Whatever you do, do NOT stick any objects under your dressing to scratch your arm! I have seen pen caps and other objects get stuck under the cast and the patient is too embarrassed to come in, so we discover the  pen cap or other items under the cast a few weeks later and it has dug into the skin making an ulcer/wound in the patient's skin. Using ice packs on your arm (the cold takes a long time to penetrate the thick dressing) or a hair dryer set to the cool setting to blow cool air down the splint/cast to help alleviate itching.

## 2023-10-26 ENCOUNTER — ANESTHESIA EVENT (OUTPATIENT)
Dept: OPERATING ROOM | Facility: HOSPITAL | Age: 28
End: 2023-10-26
Payer: COMMERCIAL

## 2023-10-26 ENCOUNTER — HOSPITAL ENCOUNTER (OUTPATIENT)
Facility: HOSPITAL | Age: 28
Setting detail: OUTPATIENT SURGERY
Discharge: HOME | End: 2023-10-26
Attending: STUDENT IN AN ORGANIZED HEALTH CARE EDUCATION/TRAINING PROGRAM | Admitting: STUDENT IN AN ORGANIZED HEALTH CARE EDUCATION/TRAINING PROGRAM
Payer: COMMERCIAL

## 2023-10-26 ENCOUNTER — ANESTHESIA (OUTPATIENT)
Dept: OPERATING ROOM | Facility: HOSPITAL | Age: 28
End: 2023-10-26
Payer: COMMERCIAL

## 2023-10-26 VITALS
WEIGHT: 197.09 LBS | HEIGHT: 60 IN | TEMPERATURE: 97 F | DIASTOLIC BLOOD PRESSURE: 63 MMHG | HEART RATE: 74 BPM | OXYGEN SATURATION: 98 % | SYSTOLIC BLOOD PRESSURE: 115 MMHG | BODY MASS INDEX: 38.69 KG/M2 | RESPIRATION RATE: 16 BRPM

## 2023-10-26 DIAGNOSIS — M79.641 HAND PAIN, RIGHT: Primary | ICD-10-CM

## 2023-10-26 DIAGNOSIS — S66.127A LACERATION OF FLEXOR MUSCLE, FASCIA AND TENDON OF LEFT LITTLE FINGER AT WRIST AND HAND LEVEL, INITIAL ENCOUNTER: ICD-10-CM

## 2023-10-26 LAB — PREGNANCY TEST URINE, POC: NEGATIVE

## 2023-10-26 PROCEDURE — 3600000008 HC OR TIME - EACH INCREMENTAL 1 MINUTE - PROCEDURE LEVEL THREE: Performed by: STUDENT IN AN ORGANIZED HEALTH CARE EDUCATION/TRAINING PROGRAM

## 2023-10-26 PROCEDURE — 2500000005 HC RX 250 GENERAL PHARMACY W/O HCPCS: Performed by: STUDENT IN AN ORGANIZED HEALTH CARE EDUCATION/TRAINING PROGRAM

## 2023-10-26 PROCEDURE — 2500000004 HC RX 250 GENERAL PHARMACY W/ HCPCS (ALT 636 FOR OP/ED): Performed by: STUDENT IN AN ORGANIZED HEALTH CARE EDUCATION/TRAINING PROGRAM

## 2023-10-26 PROCEDURE — 3600000003 HC OR TIME - INITIAL BASE CHARGE - PROCEDURE LEVEL THREE: Performed by: STUDENT IN AN ORGANIZED HEALTH CARE EDUCATION/TRAINING PROGRAM

## 2023-10-26 PROCEDURE — 3700000001 HC GENERAL ANESTHESIA TIME - INITIAL BASE CHARGE: Performed by: STUDENT IN AN ORGANIZED HEALTH CARE EDUCATION/TRAINING PROGRAM

## 2023-10-26 PROCEDURE — 81025 URINE PREGNANCY TEST: CPT | Performed by: STUDENT IN AN ORGANIZED HEALTH CARE EDUCATION/TRAINING PROGRAM

## 2023-10-26 PROCEDURE — 26055 INCISE FINGER TENDON SHEATH: CPT | Performed by: STUDENT IN AN ORGANIZED HEALTH CARE EDUCATION/TRAINING PROGRAM

## 2023-10-26 PROCEDURE — 3700000002 HC GENERAL ANESTHESIA TIME - EACH INCREMENTAL 1 MINUTE: Performed by: STUDENT IN AN ORGANIZED HEALTH CARE EDUCATION/TRAINING PROGRAM

## 2023-10-26 PROCEDURE — A4217 STERILE WATER/SALINE, 500 ML: HCPCS | Performed by: STUDENT IN AN ORGANIZED HEALTH CARE EDUCATION/TRAINING PROGRAM

## 2023-10-26 PROCEDURE — 7100000009 HC PHASE TWO TIME - INITIAL BASE CHARGE: Performed by: STUDENT IN AN ORGANIZED HEALTH CARE EDUCATION/TRAINING PROGRAM

## 2023-10-26 PROCEDURE — 7100000010 HC PHASE TWO TIME - EACH INCREMENTAL 1 MINUTE: Performed by: STUDENT IN AN ORGANIZED HEALTH CARE EDUCATION/TRAINING PROGRAM

## 2023-10-26 RX ORDER — PROPOFOL 10 MG/ML
INJECTION, EMULSION INTRAVENOUS CONTINUOUS PRN
Status: DISCONTINUED | OUTPATIENT
Start: 2023-10-26 | End: 2023-10-26

## 2023-10-26 RX ORDER — HYDROCODONE BITARTRATE AND ACETAMINOPHEN 5; 325 MG/1; MG/1
1 TABLET ORAL EVERY 6 HOURS PRN
Qty: 20 TABLET | Refills: 0 | Status: SHIPPED | OUTPATIENT
Start: 2023-10-26 | End: 2023-10-31

## 2023-10-26 RX ORDER — ONDANSETRON 4 MG/1
4 TABLET, ORALLY DISINTEGRATING ORAL EVERY 8 HOURS PRN
Qty: 20 TABLET | Refills: 0 | Status: SHIPPED | OUTPATIENT
Start: 2023-10-26

## 2023-10-26 RX ORDER — CEFAZOLIN SODIUM 2 G/100ML
2 INJECTION, SOLUTION INTRAVENOUS ONCE
Status: COMPLETED | OUTPATIENT
Start: 2023-10-26 | End: 2023-10-26

## 2023-10-26 RX ORDER — SODIUM CHLORIDE 0.9 G/100ML
IRRIGANT IRRIGATION AS NEEDED
Status: DISCONTINUED | OUTPATIENT
Start: 2023-10-26 | End: 2023-10-26 | Stop reason: HOSPADM

## 2023-10-26 RX ORDER — MIDAZOLAM HYDROCHLORIDE 1 MG/ML
INJECTION, SOLUTION INTRAMUSCULAR; INTRAVENOUS AS NEEDED
Status: DISCONTINUED | OUTPATIENT
Start: 2023-10-26 | End: 2023-10-26

## 2023-10-26 RX ORDER — SODIUM CHLORIDE, SODIUM LACTATE, POTASSIUM CHLORIDE, CALCIUM CHLORIDE 600; 310; 30; 20 MG/100ML; MG/100ML; MG/100ML; MG/100ML
100 INJECTION, SOLUTION INTRAVENOUS CONTINUOUS
Status: DISCONTINUED | OUTPATIENT
Start: 2023-10-26 | End: 2023-10-26 | Stop reason: HOSPADM

## 2023-10-26 RX ORDER — PROPOFOL 10 MG/ML
INJECTION, EMULSION INTRAVENOUS AS NEEDED
Status: DISCONTINUED | OUTPATIENT
Start: 2023-10-26 | End: 2023-10-26

## 2023-10-26 RX ADMIN — PROPOFOL 40 MG: 10 INJECTION, EMULSION INTRAVENOUS at 10:17

## 2023-10-26 RX ADMIN — CEFAZOLIN SODIUM 2 G: 2 INJECTION, SOLUTION INTRAVENOUS at 10:20

## 2023-10-26 RX ADMIN — MIDAZOLAM 2 MG: 1 INJECTION INTRAMUSCULAR; INTRAVENOUS at 10:15

## 2023-10-26 RX ADMIN — SODIUM CHLORIDE, POTASSIUM CHLORIDE, SODIUM LACTATE AND CALCIUM CHLORIDE: 600; 310; 30; 20 INJECTION, SOLUTION INTRAVENOUS at 10:15

## 2023-10-26 RX ADMIN — PROPOFOL 80 MCG/KG/MIN: 10 INJECTION, EMULSION INTRAVENOUS at 10:17

## 2023-10-26 RX ADMIN — SODIUM CHLORIDE, POTASSIUM CHLORIDE, SODIUM LACTATE AND CALCIUM CHLORIDE 100 ML/HR: 600; 310; 30; 20 INJECTION, SOLUTION INTRAVENOUS at 08:54

## 2023-10-26 ASSESSMENT — PAIN - FUNCTIONAL ASSESSMENT
PAIN_FUNCTIONAL_ASSESSMENT: 0-10

## 2023-10-26 ASSESSMENT — PAIN SCALES - GENERAL
PAINLEVEL_OUTOF10: 0 - NO PAIN
PAINLEVEL_OUTOF10: 6
PAINLEVEL_OUTOF10: 0 - NO PAIN
PAIN_LEVEL: 0

## 2023-10-26 ASSESSMENT — COLUMBIA-SUICIDE SEVERITY RATING SCALE - C-SSRS
1. IN THE PAST MONTH, HAVE YOU WISHED YOU WERE DEAD OR WISHED YOU COULD GO TO SLEEP AND NOT WAKE UP?: NO
1. IN THE PAST MONTH, HAVE YOU WISHED YOU WERE DEAD OR WISHED YOU COULD GO TO SLEEP AND NOT WAKE UP?: NO
2. HAVE YOU ACTUALLY HAD ANY THOUGHTS OF KILLING YOURSELF?: NO
6. HAVE YOU EVER DONE ANYTHING, STARTED TO DO ANYTHING, OR PREPARED TO DO ANYTHING TO END YOUR LIFE?: NO
2. HAVE YOU ACTUALLY HAD ANY THOUGHTS OF KILLING YOURSELF?: NO
6. HAVE YOU EVER DONE ANYTHING, STARTED TO DO ANYTHING, OR PREPARED TO DO ANYTHING TO END YOUR LIFE?: NO

## 2023-10-26 NOTE — OP NOTE
RIGHT HAND TENDON REPAIR (R) Operative Note     Date: 10/26/2023  OR Location: [facility]    Name: Dawn Frederick, : 1995, Age: 28 y.o., MRN: [id number], Sex: female    Diagnosis  Pre-op Diagnosis     * Laceration of flexor muscle, fascia and tendon of right little finger at wrist and hand level, initial encounter [S66.127] Post-op Diagnosis     * locked trigger finger, right small finger     Procedures    * A1 pulley release, right small finger    Surgeons      * Teresa Kate [facility] - Primary    Resident/Fellow/Other Assistant:  Surgeon(s) and Role:    Procedure Summary  Anesthesia: General  ASA: ASA status not filed in the log.  Anesthesia Staff: No anesthesia staff entered.  Estimated Blood Loss: 3mL             Anesthesia Record               Intraprocedure I/O Totals       None           Specimen: No specimens collected     Staff:   Circulator: Meeta White RN  Scrub Person: Stella Rush         Drains and/or Catheters: * None in log *    Tourniquet Times:   * Missing tourniquet times found for documented tourniquets in lo *     Implants:  Implants       Type Name Action Serial No.      Implant SUTURE ANCHOR, MICRO CORKSCREW FT, W/4-0 FIBERWIRE, NEEDLES, K-WIRES - [id number] Implanted               Findings: Intact flexor tendon.  A1 pulley with caught FDP underneath.  Cascade not intact under anesthesia.  After release of A1 pulley, cascade restored.  Patient woken up and had full flexion of DIP and PIP.     Indications: Dawn Frederick is an 28 y.o. female who is having surgery for presumed tendon injury.  She fell up the stairs and had subsequent inability to flex her small finger at the DIP and PIP.  It was believed that this was resulting from a jersey finger..     The patient was seen in the preoperative area. The risks, benefits, complications, treatment options, non-operative alternatives, expected recovery and outcomes were discussed with the patient. The possibilities of reaction  to medication, pulmonary aspiration, injury to surrounding structures, bleeding, recurrent infection, the need for additional procedures, failure to diagnose a condition, and creating a complication requiring transfusion or operation were discussed with the patient. The patient concurred with the proposed plan, giving informed consent.  The site of surgery was properly noted/marked if necessary per policy. The patient has been actively warmed in preoperative area. Preoperative antibiotics have been ordered and given within 1 hours of incision. Venous thrombosis prophylaxis are not indicated.    Procedure Details: Patient brought to the operating room and transferred to the OR table.  She underwent MAC anesthesia and a digital block was performed.  The hand was prepped draped in standard sterile fashion and timeout performed with the entire team.  A Brunner's incision was utilized from the A1 pulley to the A5 pulley inspection of the pulley system revealed that these were intact.  Both FDS and FDP were intact.  Neurovascular bundles were also assessed and were intact.  Inspection of the distal A1 pulley revealed a tendon that was caught between the A1 and A2 pulleys.  A traction tenolysis was performed of the FDP.  The A1 pulley was released.  Patient was then woken up to assess her motion and patient subsequently was able to flex her finger at the DIP and PIP.  Cascade was restored.  Complications:  None; patient tolerated the procedure well.    Disposition: PACU - hemodynamically stable.  Condition: stable         Additional Details: grabiel Kate [facility]  Phone Number: [phone]

## 2023-10-26 NOTE — ANESTHESIA PREPROCEDURE EVALUATION
Patient: Dawn Frederick    Procedure Information       Date/Time: 10/26/23 1000    Procedure: RIGHT HAND TENDON REPAIR (Right: Little Finger)    Location: [facility] 12 / [facility]    Surgeons: Teresa Elena MD            Relevant Problems   Anesthesia (within normal limits)      Cardiovascular (within normal limits)      Endocrine (within normal limits)      GI (within normal limits)      /Renal (within normal limits)      Neuro/Psych (within normal limits)      Pulmonary (within normal limits)      GI/Hepatic (within normal limits)      Hematology (within normal limits)      Musculoskeletal (within normal limits)      Eyes, Ears, Nose, and Throat (within normal limits)      Infectious Disease (within normal limits)       Clinical information reviewed:   Tobacco  Allergies  Meds   Med Hx  Surg Hx  OB Status  Fam Hx  Soc   Hx        NPO Detail:  NPO/Void Status  Carbonhydrate Drink Given Prior to Surgery? : N  Date of Last Liquid: 10/25/23  Time of Last Liquid: 2300  Date of Last Solid: 10/25/23  Time of Last Solid: 2230  Last Intake Type: Clear fluids  Time of Last Void: 0830         Physical Exam    Airway  Mallampati: I     Cardiovascular   Rhythm: regular  Rate: normal     Dental    Pulmonary - normal exam     Abdominal - normal exam             Anesthesia Plan    ASA 1     MAC     intravenous induction   Postoperative administration of opioids is intended.  Anesthetic plan and risks discussed with patient.

## 2023-10-26 NOTE — ANESTHESIA POSTPROCEDURE EVALUATION
Patient: Dawn Frederick    Procedure Summary       Date: 10/26/23 Room / Location: [facility]    Anesthesia Start: 1015 Anesthesia Stop:     Procedure: RIGHT HAND TENDON REPAIR (Right: Little Finger) Diagnosis:       Laceration of flexor muscle, fascia and tendon of left little finger at wrist and hand level, initial encounter      (Laceration of flexor muscle, fascia and tendon of left little finger at wrist and hand level, initial encounter [S66.127A])    Surgeons: Teresa Elena MD Responsible Provider: Ole Gaffney DO    Anesthesia Type: MAC ASA Status: 1            Anesthesia Type: MAC    Vitals Value Taken Time   /71 10/26/23 1050   Pulse 73 10/26/23 1050   Resp 15 10/26/23 1050   SpO2 100 10/26/23 1050       Anesthesia Post Evaluation    Patient location during evaluation: bedside  Patient participation: complete - patient participated  Level of consciousness: awake and alert  Pain score: 0  Pain management: adequate  Airway patency: patent  Cardiovascular status: acceptable  Respiratory status: acceptable  Hydration status: acceptable        No notable events documented.

## 2023-11-01 PROBLEM — E66.9 OBESITY, CLASS II, BMI 35-39.9: Status: ACTIVE | Noted: 2023-06-15

## 2023-11-01 PROBLEM — K55.9: Status: ACTIVE | Noted: 2020-08-07

## 2023-11-01 PROBLEM — N70.93 TOA (TUBO-OVARIAN ABSCESS): Status: ACTIVE | Noted: 2017-04-19

## 2023-11-01 PROBLEM — R73.01 IFG (IMPAIRED FASTING GLUCOSE): Status: ACTIVE | Noted: 2020-08-07

## 2023-11-01 PROBLEM — S83.419A GRADE 2 SPRAIN OF MEDIAL COLLATERAL LIGAMENT OF KNEE: Status: ACTIVE | Noted: 2023-11-01

## 2023-11-01 PROBLEM — E66.9 OBESITY, CLASS I, BMI 30-34.9: Status: ACTIVE | Noted: 2021-05-26

## 2023-11-01 PROBLEM — K52.9: Status: ACTIVE | Noted: 2020-08-07

## 2023-11-01 PROBLEM — K76.0 FATTY LIVER: Status: ACTIVE | Noted: 2020-08-07

## 2023-11-01 PROBLEM — R10.2 PELVIC PAIN: Status: ACTIVE | Noted: 2023-07-05

## 2023-11-01 PROBLEM — N13.30 HYDRONEPHROSIS: Status: ACTIVE | Noted: 2021-05-26

## 2023-11-01 PROBLEM — S83.90XA KNEE SPRAIN: Status: ACTIVE | Noted: 2023-11-01

## 2023-11-01 PROBLEM — N20.9 URINARY STONE: Status: ACTIVE | Noted: 2021-05-26

## 2023-11-01 PROBLEM — N20.1 RIGHT URETERAL STONE: Status: ACTIVE | Noted: 2021-05-27

## 2023-11-01 PROBLEM — S83.419A TEAR OF MCL (MEDIAL COLLATERAL LIGAMENT) OF KNEE: Status: ACTIVE | Noted: 2023-11-01

## 2023-11-01 PROBLEM — N20.0 CALCULUS OF KIDNEY: Status: ACTIVE | Noted: 2020-08-07

## 2023-11-01 RX ORDER — PROGESTERONE 200 MG/1
200 CAPSULE ORAL
COMMUNITY
Start: 2023-08-11

## 2023-11-01 RX ORDER — MEDROXYPROGESTERONE ACETATE 10 MG/1
10 TABLET ORAL
COMMUNITY
Start: 2023-08-11

## 2023-11-01 RX ORDER — NAPROXEN SODIUM 220 MG
TABLET ORAL
COMMUNITY

## 2023-11-01 RX ORDER — METHYLPREDNISOLONE 4 MG/1
TABLET ORAL
COMMUNITY
Start: 2023-09-25

## 2023-11-01 RX ORDER — IBUPROFEN 800 MG/1
TABLET ORAL
COMMUNITY
Start: 2023-10-16

## 2023-11-01 RX ORDER — NAPROXEN 500 MG/1
1 TABLET ORAL 2 TIMES DAILY PRN
COMMUNITY
Start: 2023-08-02

## 2023-11-01 RX ORDER — ALBUTEROL SULFATE 90 UG/1
AEROSOL, METERED RESPIRATORY (INHALATION)
COMMUNITY
Start: 2018-11-27

## 2023-11-02 NOTE — PROGRESS NOTES
Occupational Therapy    Occupational Therapy Evaluation    Name: Dawn Frederick  MRN: [id number]  : 1995   DATE: 2023   Time Calculation  Start Time: 0305  Stop Time: 0350  Time Calculation (min): 45 min     Insurance Authorization Request: [employer]// 20V PT OT ST COPAY 0 DED 2000(1321) 4000(1321) COVERAGE 80 OOP 5550( 1705) 60886(1705) NO AUTH REQ     Assessment:  Patient is a 28 y.o. male who is 8 days s/p right SF A1 pulley release surgery.    Patient presented with pain, edema, joint stiffness, capsular tightness, and muscle weakness. 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 Meaningful leisure activity is playing video games. Educated patient on edema massage in elevated position. Started patient on active tendon gliding exercises to decrease scar adhesion and facilitate motion. Patient will benefit from skilled OT for pain/edema/scar management, ROM, and progressive strengthening to support return to all ADL/ IADL, work, and leisure activities.     Plan:  Outpatient Plan  OT Plan: Modalities, therapeutic exercise, therapeutic activity, neuromotor re-educaiton, manual therapy, work conditioning  Frequency: 2x/wk  Duration: 6 weeks  Rehab Potential: Good  Plan of Care Agreement: Patient    Subjective   Current Problem:  Problem List Items Addressed This Visit    None  Visit Diagnoses       Finger stiffness, right    -  Primary         Right finger stiffness    DOI: 10/16/2023    Surgical Procedure: SF A1 pulley release   DOS: 10/26/2023  Hand Dominance: Right   Affected Extremity: Right    Mechanism of Injury: Fell off the stairs. Couldn't bend right SF. Proceeded with flexor tendon repair surgery     Precautions: N/A    Pain Assessment:  Location: Along incision and ulnar side of palm/SF  7/10 at rest, 7/10 at highest  Description: sharp      Homeliving/Social Support: Live home with fiance     Work: Loan banker - desk top  "job    Meaningful Activities: Patient enjoys video games     Previous Level of Function Per Patient/Caregiver Report: Independent with ADL, IADL, work and leisure activities    Chief complaint: pain, stiffness    Patient stated goals for therapy: \"Regain ROM and have my pinky back working normal again. \"       Objective   UE Assessment  Observation: Mod edema presented along SF. Dissolvable sutures intact. No sign of infection.     Palpation:     Range of Motion (in degrees)  Shoulder AROM: WNL    PROM: WNL    Elbow AROM: WNL     PROM: WNL    Wrist AROM: WNL     PROM: WNL    Digits AROM:   RF MCP 0/70  PIP -20/70   DIP 0/30 PROM: full composite flexion and extension  SF MCP -10/25 PIP -20/20 DIP -10/10   PROM: flexion SF MCP and PIP 90, DIP 15 - patient had difficulty tolerating due to high pain level     Thumb AROM: WNL   PROM: WNL    Sensation: tingling in tip of SF occasionally      Strength: TBT   strength: R  L  Lateral pinch strength: R  L  3 point pinch strength: R  L   Rapid Exchange :    Coordination: TBT     Special Test: N/A    Treatment Performed: Gentle PROM of RF/SF MCP/PIP/DIP into isolated and composite flex/ext. Active tendon glide - straight, hook, tabletop, straight fist, composite fist. PIP and DIP blocking with hold at end range flexion. Extra time required due to patient's high pain level.     Outcome Measures:  Quick Dash Score: at eval 68.18%    OP EDUCATION:  Education  Individual(s) Educated: Patient, Significant Other  Education Provided: Anatomy & Physiology, Diagnosis & Precautions, Risk and benefits of OT discussed with patient or other, POC discussed and agreed upon  Home Program: AROM, Handout issued  Patient/Caregiver Demonstrated Understanding: yes  Plan of Care Discussed and Agreed Upon: yes  Patient Response to Education: Patient/Caregiver Verbalized Understanding of Information, Patient/Caregiver Performed Return Demonstration of Exercises/Activities, Patient/Caregiver " Asked Appropriate Questions     Goals:  By discharge (6 weeks) Dawn Frederick  will achieve the following goals:     1. Patient to demonstrate AROM SF into full composite flexion for dressing and grooming  2. Patient to report pain 0/10 at rest for sleeping  3. Patient to lift and carry 10# with right hand with no difficulty for household chores  4. Patient to demonstrate proper technique and competence with HEP   5. Patient to score disability rate less than 10% on Quick DASH

## 2023-11-03 ENCOUNTER — EVALUATION (OUTPATIENT)
Dept: OCCUPATIONAL THERAPY | Facility: CLINIC | Age: 28
End: 2023-11-03

## 2023-11-03 ENCOUNTER — OFFICE VISIT (OUTPATIENT)
Dept: ORTHOPEDIC SURGERY | Facility: CLINIC | Age: 28
End: 2023-11-03
Payer: COMMERCIAL

## 2023-11-03 DIAGNOSIS — M25.641 FINGER STIFFNESS, RIGHT: Primary | ICD-10-CM

## 2023-11-03 DIAGNOSIS — M79.644 FINGER PAIN, RIGHT: Primary | ICD-10-CM

## 2023-11-03 PROCEDURE — 99024 POSTOP FOLLOW-UP VISIT: CPT | Performed by: STUDENT IN AN ORGANIZED HEALTH CARE EDUCATION/TRAINING PROGRAM

## 2023-11-03 PROCEDURE — 97165 OT EVAL LOW COMPLEX 30 MIN: CPT | Mod: GO | Performed by: OCCUPATIONAL THERAPIST

## 2023-11-03 PROCEDURE — 1036F TOBACCO NON-USER: CPT | Performed by: STUDENT IN AN ORGANIZED HEALTH CARE EDUCATION/TRAINING PROGRAM

## 2023-11-03 PROCEDURE — 97110 THERAPEUTIC EXERCISES: CPT | Mod: GO | Performed by: OCCUPATIONAL THERAPIST

## 2023-11-03 NOTE — Clinical Note
November 6, 2023     Patient: Dawn Frederick   YOB: 1995   Date of Visit: 11/3/2023       To Whom it May Concern:    Dawn Frederick was seen in my clinic on 11/3/2023. She {Return to school/sport:02947}.    If you have any questions or concerns, please don't hesitate to call.         Sincerely,          Jess Carolina OT        CC: No Recipients

## 2023-11-03 NOTE — Clinical Note
November 6, 2023     Patient: Dawn Frederick   YOB: 1995   Date of Visit: 11/3/2023       To Whom It May Concern:    It is my medical opinion that Dawn Frederick {Work release (duty restriction):12700}.    If you have any questions or concerns, please don't hesitate to call.         Sincerely,        Jess Carolina OT    CC: No Recipients

## 2023-11-03 NOTE — PROGRESS NOTES
Presents for follow up of right small finger exploration.  Discussed findings of adhesion and intact tendons at length today. S/p October 26 small finger exploration. Doing well. Denies numbness or tingling.     Physical Examination:  The patient appears to be their stated age, is in no apparent distress, and is oriented x3. The patients mood and affect are appropriate. The patients gait is normal. The examination of the limb in question was performed in comparison to the contralateral limb.    Dressing removed  Incision c/d/I  AIN, PIN, ulnar intact  SILT A/R/U/M  Hand wwp      Assessment:  1  weeks post op    Plan:   She is getting into occupational therapy today.  She has no restrictions.  Encouraged passive and active range of motion.  I would like to see her back in 1 month for motion check.    Teresa Kate MD

## 2023-11-04 ENCOUNTER — PATIENT MESSAGE (OUTPATIENT)
Dept: ORTHOPEDIC SURGERY | Facility: CLINIC | Age: 28
End: 2023-11-04

## 2023-11-06 ASSESSMENT — PATIENT HEALTH QUESTIONNAIRE - PHQ9
1. LITTLE INTEREST OR PLEASURE IN DOING THINGS: NOT AT ALL
SUM OF ALL RESPONSES TO PHQ9 QUESTIONS 1 AND 2: 0
2. FEELING DOWN, DEPRESSED OR HOPELESS: NOT AT ALL

## 2023-11-06 ASSESSMENT — ENCOUNTER SYMPTOMS
OCCASIONAL FEELINGS OF UNSTEADINESS: 0
DEPRESSION: 0
LOSS OF SENSATION IN FEET: 0

## 2023-11-10 ENCOUNTER — APPOINTMENT (OUTPATIENT)
Dept: OCCUPATIONAL THERAPY | Facility: CLINIC | Age: 28
End: 2023-11-10

## 2023-11-10 ENCOUNTER — APPOINTMENT (OUTPATIENT)
Dept: ORTHOPEDIC SURGERY | Facility: CLINIC | Age: 28
End: 2023-11-10
Payer: COMMERCIAL

## 2023-11-15 ENCOUNTER — APPOINTMENT (OUTPATIENT)
Dept: OCCUPATIONAL THERAPY | Facility: CLINIC | Age: 28
End: 2023-11-15

## 2024-01-31 ENCOUNTER — HOSPITAL ENCOUNTER (EMERGENCY)
Facility: HOSPITAL | Age: 29
Discharge: HOME | End: 2024-01-31
Attending: EMERGENCY MEDICINE
Payer: COMMERCIAL

## 2024-01-31 ENCOUNTER — APPOINTMENT (OUTPATIENT)
Dept: RADIOLOGY | Facility: HOSPITAL | Age: 29
End: 2024-01-31

## 2024-01-31 VITALS
TEMPERATURE: 98.1 F | DIASTOLIC BLOOD PRESSURE: 62 MMHG | OXYGEN SATURATION: 99 % | RESPIRATION RATE: 19 BRPM | WEIGHT: 201 LBS | SYSTOLIC BLOOD PRESSURE: 121 MMHG | HEART RATE: 71 BPM | HEIGHT: 60 IN | BODY MASS INDEX: 39.46 KG/M2

## 2024-01-31 DIAGNOSIS — N93.9 VAGINAL BLEEDING: Primary | ICD-10-CM

## 2024-01-31 LAB
ABO GROUP (TYPE) IN BLOOD: NORMAL
ALBUMIN SERPL BCP-MCNC: 4.5 G/DL (ref 3.4–5)
ALP SERPL-CCNC: 64 U/L (ref 33–110)
ALT SERPL W P-5'-P-CCNC: 59 U/L (ref 7–45)
ANION GAP SERPL CALC-SCNC: 12 MMOL/L (ref 10–20)
ANTIBODY SCREEN: NORMAL
APPEARANCE UR: ABNORMAL
APTT PPP: 32 SECONDS (ref 27–38)
AST SERPL W P-5'-P-CCNC: 36 U/L (ref 9–39)
BACTERIA #/AREA URNS AUTO: ABNORMAL /HPF
BASOPHILS # BLD AUTO: 0.03 X10*3/UL (ref 0–0.1)
BASOPHILS NFR BLD AUTO: 0.4 %
BILIRUB SERPL-MCNC: 0.4 MG/DL (ref 0–1.2)
BILIRUB UR STRIP.AUTO-MCNC: NEGATIVE MG/DL
BUN SERPL-MCNC: 12 MG/DL (ref 6–23)
CALCIUM SERPL-MCNC: 9.7 MG/DL (ref 8.6–10.3)
CHLORIDE SERPL-SCNC: 106 MMOL/L (ref 98–107)
CO2 SERPL-SCNC: 25 MMOL/L (ref 21–32)
COLOR UR: YELLOW
CREAT SERPL-MCNC: 0.8 MG/DL (ref 0.5–1.05)
EGFRCR SERPLBLD CKD-EPI 2021: >90 ML/MIN/1.73M*2
EOSINOPHIL # BLD AUTO: 0.13 X10*3/UL (ref 0–0.7)
EOSINOPHIL NFR BLD AUTO: 1.6 %
ERYTHROCYTE [DISTWIDTH] IN BLOOD BY AUTOMATED COUNT: 12.4 % (ref 11.5–14.5)
GLUCOSE SERPL-MCNC: 100 MG/DL (ref 74–99)
GLUCOSE UR STRIP.AUTO-MCNC: NEGATIVE MG/DL
HCT VFR BLD AUTO: 45.2 % (ref 36–46)
HGB BLD-MCNC: 15.4 G/DL (ref 12–16)
IMM GRANULOCYTES # BLD AUTO: 0.03 X10*3/UL (ref 0–0.7)
IMM GRANULOCYTES NFR BLD AUTO: 0.4 % (ref 0–0.9)
INR PPP: 1.1 (ref 0.9–1.1)
KETONES UR STRIP.AUTO-MCNC: NEGATIVE MG/DL
LEUKOCYTE ESTERASE UR QL STRIP.AUTO: NEGATIVE
LYMPHOCYTES # BLD AUTO: 3.74 X10*3/UL (ref 1.2–4.8)
LYMPHOCYTES NFR BLD AUTO: 45.3 %
MCH RBC QN AUTO: 32.8 PG (ref 26–34)
MCHC RBC AUTO-ENTMCNC: 34.1 G/DL (ref 32–36)
MCV RBC AUTO: 96 FL (ref 80–100)
MONOCYTES # BLD AUTO: 0.44 X10*3/UL (ref 0.1–1)
MONOCYTES NFR BLD AUTO: 5.3 %
NEUTROPHILS # BLD AUTO: 3.89 X10*3/UL (ref 1.2–7.7)
NEUTROPHILS NFR BLD AUTO: 47 %
NITRITE UR QL STRIP.AUTO: NEGATIVE
NRBC BLD-RTO: 0 /100 WBCS (ref 0–0)
PH UR STRIP.AUTO: 5 [PH]
PLATELET # BLD AUTO: 250 X10*3/UL (ref 150–450)
POTASSIUM SERPL-SCNC: 3.7 MMOL/L (ref 3.5–5.3)
PROT SERPL-MCNC: 7.9 G/DL (ref 6.4–8.2)
PROT UR STRIP.AUTO-MCNC: NEGATIVE MG/DL
PROTHROMBIN TIME: 12.2 SECONDS (ref 9.8–12.8)
RBC # BLD AUTO: 4.69 X10*6/UL (ref 4–5.2)
RBC # UR STRIP.AUTO: ABNORMAL /UL
RBC #/AREA URNS AUTO: ABNORMAL /HPF
RH FACTOR (ANTIGEN D): NORMAL
SODIUM SERPL-SCNC: 139 MMOL/L (ref 136–145)
SP GR UR STRIP.AUTO: 1.02
SQUAMOUS #/AREA URNS AUTO: ABNORMAL /HPF
UROBILINOGEN UR STRIP.AUTO-MCNC: <2 MG/DL
WBC # BLD AUTO: 8.3 X10*3/UL (ref 4.4–11.3)
WBC #/AREA URNS AUTO: ABNORMAL /HPF

## 2024-01-31 PROCEDURE — 85025 COMPLETE CBC W/AUTO DIFF WBC: CPT | Performed by: NURSE PRACTITIONER

## 2024-01-31 PROCEDURE — 85610 PROTHROMBIN TIME: CPT | Performed by: NURSE PRACTITIONER

## 2024-01-31 PROCEDURE — 81001 URINALYSIS AUTO W/SCOPE: CPT | Performed by: NURSE PRACTITIONER

## 2024-01-31 PROCEDURE — 36415 COLL VENOUS BLD VENIPUNCTURE: CPT | Performed by: NURSE PRACTITIONER

## 2024-01-31 PROCEDURE — 99284 EMERGENCY DEPT VISIT MOD MDM: CPT | Mod: 25 | Performed by: EMERGENCY MEDICINE

## 2024-01-31 PROCEDURE — 80053 COMPREHEN METABOLIC PANEL: CPT | Performed by: NURSE PRACTITIONER

## 2024-01-31 PROCEDURE — 76856 US EXAM PELVIC COMPLETE: CPT

## 2024-01-31 PROCEDURE — 76856 US EXAM PELVIC COMPLETE: CPT | Performed by: RADIOLOGY

## 2024-01-31 PROCEDURE — 86901 BLOOD TYPING SEROLOGIC RH(D): CPT | Performed by: NURSE PRACTITIONER

## 2024-01-31 PROCEDURE — 76830 TRANSVAGINAL US NON-OB: CPT | Performed by: RADIOLOGY

## 2024-01-31 ASSESSMENT — COLUMBIA-SUICIDE SEVERITY RATING SCALE - C-SSRS
6. HAVE YOU EVER DONE ANYTHING, STARTED TO DO ANYTHING, OR PREPARED TO DO ANYTHING TO END YOUR LIFE?: NO
2. HAVE YOU ACTUALLY HAD ANY THOUGHTS OF KILLING YOURSELF?: NO
1. IN THE PAST MONTH, HAVE YOU WISHED YOU WERE DEAD OR WISHED YOU COULD GO TO SLEEP AND NOT WAKE UP?: NO

## 2024-01-31 NOTE — ED TRIAGE NOTES
This patient was seen and examined in triage    HPI:  Patient is a healthy nontoxic-appearing 28-year-old female with past medical history of renal calculi, chest pain, hydronephrosis, migraine, pelvic pain, presents the emergency today for complaint of vaginal bleeding.  Patient states of the past month she has had persistent vaginal bleeding and pelvic discomfort.  Patient states she estimated going through about 1 pad per day.  Patient denies any hormonal medication use.  Patient denies any abdominal discomfort, nausea,, diarrhea or constipation, urinary complaints, back pain, chest pain, shortness of breath difficulty breathing, lightness, dizziness.  Patient denies any fever, shaking, or chills.    Focused PE:  Gen: Well-appearing, not in acute distress  Cardiovascular: Regular rate, normal rhythm, no murmur, no gallop  Respiratory: No adventitious lung sounds auscultated.  Abdomen: No reproducible abdominal tenderness upon palpation,  physical exam may be limited by patient positioning sitting up in a chair  Neuro:  Alert and Oriented, speech clear and coherent    Plan:  Lab work entered from triage.    For the remainder the patient's work-up and ED course, please see the main ED provider note.  We discussed need for diagnostic testing including lab studies and imaging.  We also discussed that they may be asked to wait in the waiting room while these test are pending.  They understand that if they choose to leave without having the testing completed or resulted that we cannot rule out acute life-threatening illnesses and the risks involved to lead to worsening condition, permanent disability or even death.

## 2024-01-31 NOTE — ED PROVIDER NOTES
HPI   Chief Complaint   Patient presents with    Vaginal Bleeding     Patient c/o vaginial bleeding x1 month intermit getting worse with abdominal cramping.       28-year-old female who presents with vaginal bleeding.  Patient states she has been having vaginal bleeding for the past month.  She states at times it is heavy and at times it is just light.  Patient describes some lower pelvic pain and cramping.  Denies any chance of pregnancy.  Patient states she has not had irregular menstrual cycles in the past.                          Nasima Coma Scale Score: 15                  Patient History   Past Medical History:   Diagnosis Date    Anxiety     Heart murmur     Nephrolithiasis     Scoliosis     Seasonal allergies     Vertigo     Wears glasses      Past Surgical History:   Procedure Laterality Date    SPINE SURGERY      to correct scoliosis,  with hardware ([employer])     No family history on file.  Social History     Tobacco Use    Smoking status: Never    Smokeless tobacco: Never   Vaping Use    Vaping Use: Every day    Substances: Nicotine    Devices: Disposable   Substance Use Topics    Alcohol use: Not Currently     Comment: occasional    Drug use: Never       Physical Exam   ED Triage Vitals [01/31/24 1355]   Temperature Heart Rate Respirations BP   36.7 °C (98.1 °F) 80 18 144/66      Pulse Ox Temp Source Heart Rate Source Patient Position   96 % Skin Monitor Sitting      BP Location FiO2 (%)     Right arm --       Physical Exam  Constitutional:       Appearance: Normal appearance. She is normal weight.   HENT:      Head: Normocephalic and atraumatic.      Nose: Nose normal.      Mouth/Throat:      Mouth: Mucous membranes are moist.      Pharynx: Oropharynx is clear.   Eyes:      Extraocular Movements: Extraocular movements intact.      Conjunctiva/sclera: Conjunctivae normal.      Pupils: Pupils are equal, round, and reactive to light.   Cardiovascular:      Rate and Rhythm: Normal rate and regular  rhythm.   Pulmonary:      Effort: Pulmonary effort is normal.      Breath sounds: Normal breath sounds.   Abdominal:      General: Abdomen is flat. Bowel sounds are normal.      Palpations: Abdomen is soft.   Musculoskeletal:         General: Normal range of motion.      Cervical back: Normal range of motion and neck supple.   Skin:     General: Skin is warm and dry.      Capillary Refill: Capillary refill takes less than 2 seconds.   Neurological:      General: No focal deficit present.      Mental Status: She is alert.   Psychiatric:         Mood and Affect: Mood normal.         Behavior: Behavior normal.         Thought Content: Thought content normal.         Judgment: Judgment normal.       Labs Reviewed   COMPREHENSIVE METABOLIC PANEL - Abnormal       Result Value    Glucose 100 (*)     Sodium 139      Potassium 3.7      Chloride 106      Bicarbonate 25      Anion Gap 12      Urea Nitrogen 12      Creatinine 0.80      eGFR >90      Calcium 9.7      Albumin 4.5      Alkaline Phosphatase 64      Total Protein 7.9      AST 36      Bilirubin, Total 0.4      ALT 59 (*)    URINALYSIS WITH REFLEX MICROSCOPIC - Abnormal    Color, Urine Yellow      Appearance, Urine Hazy (*)     Specific Gravity, Urine 1.019      pH, Urine 5.0      Protein, Urine NEGATIVE      Glucose, Urine NEGATIVE      Blood, Urine SMALL (1+) (*)     Ketones, Urine NEGATIVE      Bilirubin, Urine NEGATIVE      Urobilinogen, Urine <2.0      Nitrite, Urine NEGATIVE      Leukocyte Esterase, Urine NEGATIVE     MICROSCOPIC ONLY, URINE - Abnormal    WBC, Urine 1-5      RBC, Urine 1-2      Squamous Epithelial Cells, Urine 1-9 (SPARSE)      Bacteria, Urine 1+ (*)    COAGULATION SCREEN - Normal    Protime 12.2      INR 1.1      aPTT 32      Narrative:     The APTT is no longer used for monitoring Unfractionated Heparin Therapy. For monitoring Heparin Therapy, use the Heparin Assay.   CBC WITH AUTO DIFFERENTIAL    WBC 8.3      nRBC 0.0      RBC 4.69       Hemoglobin 15.4      Hematocrit 45.2      MCV 96      MCH 32.8      MCHC 34.1      RDW 12.4      Platelets 250      Neutrophils % 47.0      Immature Granulocytes %, Automated 0.4      Lymphocytes % 45.3      Monocytes % 5.3      Eosinophils % 1.6      Basophils % 0.4      Neutrophils Absolute 3.89      Immature Granulocytes Absolute, Automated 0.03      Lymphocytes Absolute 3.74      Monocytes Absolute 0.44      Eosinophils Absolute 0.13      Basophils Absolute 0.03     TYPE AND SCREEN       US PELVIS TRANSABDOMINAL WITH TRANSVAGINAL   Final Result   Endometrium measures 15 mm in thickness, within normal limits in a   premenopausal female depending on the phase of the menstrual cycle.   Small nonspecific cysts in the inferior aspect.        Unremarkable ovaries.        Signed by: Teresita Steven 1/31/2024 5:10 PM   Dictation workstation:   [id number]            ED Course & MDM   Diagnoses as of 01/31/24 1729   Vaginal bleeding       Medical Decision Making  Emergency department course, laboratory studies were obtained and reviewed patient is a hemoglobin of 15.4.  Electrolytes are unremarkable.  Urine shows some slight blood hCG negative.  Transvaginal ultrasound shows endometrium that measures 15 mm in thickness which is within normal limits and a small nonspecific cyst in the inferior aspect.  With blood work being unremarkable I am going to refer the patient to OB since she is having persistent bleeding.  Patient is agreeable with this plan and will be discharged home.        Procedure  Procedures     Gosia Strong DO  01/31/24 1729

## (undated) DEVICE — SUTURE, ETHILON, 4-0, 18, PS2, BLACK

## (undated) DEVICE — SYRINGE, 10 CC, LUER LOCK

## (undated) DEVICE — STRAP, ARM BOARD, 32 X 1.5

## (undated) DEVICE — Device

## (undated) DEVICE — GLOVE, SURGICAL, PROTEXIS PI BLUE W/NEUTHERA, 6.5, PF, LF

## (undated) DEVICE — SYRINGE, 60 CC, IRRIGATION, BULB, CONTRO-BULB, PAPER POUCH

## (undated) DEVICE — CORD, ELECTROSURGICAL, BIPOLAR

## (undated) DEVICE — SUTURE, MONOCRYL, 4-0, 18 IN, PS2, UNDYED

## (undated) DEVICE — DRAPE, SHEET, 17 X 23 IN

## (undated) DEVICE — GOWN, SURGICAL, ROYAL SILK, LG, STERILE

## (undated) DEVICE — TRAY, DRY PREP, PREMIUM

## (undated) DEVICE — PADDING, CAST, SPECIALIST, 4 IN X 4 YD, STERILE

## (undated) DEVICE — BANDAGE, COHESIVE, HAND TEAR, COFLEX, 2 X 5 YDS, LF

## (undated) DEVICE — SUTURE, CHROMIC 5-0 RB1, 27IN

## (undated) DEVICE — SUTURE, ETHIBOND, 3-0, 30 IN, SH, DA, GREEN

## (undated) DEVICE — BANDAGE, ESMARK 4 IN X 9 FT, STERILE

## (undated) DEVICE — GLOVE, SURGICAL, PROTEXIS PI , 6.5, PF, LF

## (undated) DEVICE — MANIFOLD, 4 PORT NEPTUNE STANDARD

## (undated) DEVICE — GLOVE, SURGICAL, PROTEXIS PI , 8.0, PF, LF

## (undated) DEVICE — NEEDLE, HYPO, 22G X 1 1/2IN, ECLIP, LF

## (undated) DEVICE — STRAP, VELCRO, BODY, 4 X 60IN, NS

## (undated) DEVICE — GOWN, SURGICAL, ROYAL SILK, XL, STERILE

## (undated) DEVICE — CUFF, TOURNIQUET, DUAL PORT, SNG BLADDER, 18 IN, PLC

## (undated) DEVICE — TOWEL PACK 10-PK

## (undated) DEVICE — DRESSING, GAUZE, PETROLATUM, PATCH, XEROFORM, 1 X 8 IN, STERILE

## (undated) DEVICE — PREP, SCRUB, SKIN, HIBICLENS, 32 OZ

## (undated) DEVICE — IMPLANTABLE DEVICE: Type: IMPLANTABLE DEVICE | Site: HAND | Status: NON-FUNCTIONAL